# Patient Record
(demographics unavailable — no encounter records)

---

## 2024-10-10 NOTE — HISTORY OF PRESENT ILLNESS
[FreeTextEntry1] : 17 year old female presents for follow up. Pt. was previously on OCP to manage her menstrual cycles but had severe nausea due to OCP and discontinued pill in May. Since May has had heavier and irregular menses. No complaints of pelvic pain.

## 2024-10-10 NOTE — PLAN
[FreeTextEntry1] : Dysmenorrhea/Menorrhagia   - US ordered  - Will determine plan of care when we review US   F/U in 2 weeks  All questions and concerns addressed during encounter. Pt. agreed to plan of care.

## 2024-11-15 NOTE — HISTORY OF PRESENT ILLNESS
[FreeTextEntry1] : 17 year old female presents for follow up. Pt. has irregular, painful menses. Pt. was previously on OCP but complaint of nausea with the medication.

## 2024-11-15 NOTE — PLAN
[FreeTextEntry1] : Dysmenorrhea/Menorrhagia with irregular cycle   - We discussed the sonogram results and treatment options. Pt. was accompanied by her mother for visit. Pt. desires to discuss and think about treatment options over the weekend and will call back with her desired plan.  - Risks, benefits and alternatives were discussed. Pt. verbalized understanding.   F/U in 1 week  All questions and concerns addressed during encounter. Pt. agreed to plan of care.